# Patient Record
Sex: MALE | Race: WHITE
[De-identification: names, ages, dates, MRNs, and addresses within clinical notes are randomized per-mention and may not be internally consistent; named-entity substitution may affect disease eponyms.]

---

## 2017-07-05 ENCOUNTER — HOSPITAL ENCOUNTER (EMERGENCY)
Dept: HOSPITAL 42 - ED | Age: 27
LOS: 1 days | Discharge: HOME | End: 2017-07-06
Payer: COMMERCIAL

## 2017-07-05 VITALS — TEMPERATURE: 98.7 F

## 2017-07-05 VITALS — BODY MASS INDEX: 23.6 KG/M2

## 2017-07-05 DIAGNOSIS — R07.89: Primary | ICD-10-CM

## 2017-07-05 DIAGNOSIS — Z82.49: ICD-10-CM

## 2017-07-05 LAB
ALBUMIN SERPL-MCNC: 3.9 G/DL
ALBUMIN/GLOB SERPL: 1.1 {RATIO}
ALT SERPL-CCNC: 36 U/L
APPEARANCE UR: (no result)
AST SERPL-CCNC: 24 U/L
BASOPHILS # BLD AUTO: 0.03 K/MM3
BASOPHILS NFR BLD: 0.4 %
BILIRUB UR-MCNC: NEGATIVE MG/DL
BUN SERPL-MCNC: 29 MG/DL
CALCIUM SERPL-MCNC: 9 MG/DL
COLOR UR: YELLOW
EOSINOPHIL # BLD: 0.3 10*3/UL
EOSINOPHIL NFR BLD: 4.8 %
ERYTHROCYTE [DISTWIDTH] IN BLOOD BY AUTOMATED COUNT: 13.1 %
GFR NON-AFRICAN AMERICAN: 53
GLUCOSE UR STRIP-MCNC: NEGATIVE MG/DL
GRANULOCYTES # BLD: 4.15 10*3/UL
GRANULOCYTES NFR BLD: 58.4 %
HGB BLD-MCNC: 14.2 GM/DL
LEUKOCYTE ESTERASE UR-ACNC: NEGATIVE LEU/UL
LYMPHOCYTES # BLD: 2.1 10*3/UL
LYMPHOCYTES NFR BLD AUTO: 29.8 %
MAGNESIUM SERPL-MCNC: 1.6 MG/DL
MCH RBC QN AUTO: 26.9 PG
MCHC RBC AUTO-ENTMCNC: 33.2 G/DL
MCV RBC AUTO: 81.2 FL
MONOCYTES # BLD AUTO: 0.5 10*3/UL
MONOCYTES NFR BLD: 6.6 %
PH UR STRIP: 6 [PH]
PLATELET # BLD: 287 10^3/UL
PMV BLD AUTO: 10.5 FL
PROT UR STRIP-MCNC: 100 MG/DL
RBC # BLD AUTO: 5.27 10^6/UL
RBC # UR STRIP: (no result) /UL
RBC #/AREA URNS HPF: (no result) /HPF
SP GR UR STRIP: 1.02
TROPONIN I SERPL-MCNC: < 0.01 NG/ML
URINE NITRATE: NEGATIVE
UROBILINOGEN UR STRIP-ACNC: 0.2 E.U./DL
WBC # BLD AUTO: 7.1 10^3/UL
WBC #/AREA URNS HPF: (no result) /HPF

## 2017-07-05 PROCEDURE — 85025 COMPLETE CBC W/AUTO DIFF WBC: CPT

## 2017-07-05 PROCEDURE — 93005 ELECTROCARDIOGRAM TRACING: CPT

## 2017-07-05 PROCEDURE — 71010: CPT

## 2017-07-05 PROCEDURE — 82550 ASSAY OF CK (CPK): CPT

## 2017-07-05 PROCEDURE — 80053 COMPREHEN METABOLIC PANEL: CPT

## 2017-07-05 PROCEDURE — 83735 ASSAY OF MAGNESIUM: CPT

## 2017-07-05 PROCEDURE — 99284 EMERGENCY DEPT VISIT MOD MDM: CPT

## 2017-07-05 PROCEDURE — 83615 LACTATE (LD) (LDH) ENZYME: CPT

## 2017-07-05 PROCEDURE — 81001 URINALYSIS AUTO W/SCOPE: CPT

## 2017-07-05 PROCEDURE — 84484 ASSAY OF TROPONIN QUANT: CPT

## 2017-07-05 NOTE — ED PDOC
Arrival/HPI





- General


Chief Complaint: Chest Pain


Time Seen by Provider: 07/05/17 21:27


Historian: Patient





- History of Present Illness


Narrative History of Present Illness (Text): 


07/05/17 21:34


Lisandro Lambert is a 26 year old male, with no significant past medical 

history, who presents to the emergency department complaining of chest pain. 

Patient states he began experiencing mid-sternal chest pain at 19:00 today 

while at work, worsened when lifting boxes. Patient states he currently feels 

better. Patient reports a family history of cardiac disease. Patient denies any 

fever, shortness of breath, nausea, vomiting, diarrhea, neck pain, or any other 

complaints. 


Time/Duration: 4-6 hours (19:00)


Symptom Onset: Gradual


Symptom Course: Improving


Activities at Onset: Light


Modifying Factors (Text): 


Lifting boxes





Context: Work


Associated Symptoms (Text): 


None








Past Medical History





- Provider Review


Nursing Documentation Reviewed: Yes





- Cardiac


Hx Hyperlipemia: Yes





- Renal


Other/Comment: pt states kidney problems





- Psychiatric


Hx Substance Use: No





- Past Surgical History


Past Surgical History: No Previous





- Anesthesia


Hx Anesthesia: No





- Suicidal Assessment


Feels Threatened In Home Enviroment: No





Family/Social History





- Physician Review


Nursing Documentation Reviewed: Yes


Family/Social History: CAD/MI


Smoking Status: Never Smoked


Hx Alcohol Use: No


Hx Substance Use: No


Hx Substance Use Treatment: No





Allergies/Home Meds


Allergies/Adverse Reactions: 


Allergies





No Known Allergies Allergy (Verified 06/26/15 20:31)


 








Home Medications: 


 Home Meds











 Medication  Instructions  Recorded  Confirmed


 


No Known Home Med  07/05/17 07/05/17














Review of Systems





- Physician Review


All systems were reviewed & negative as marked: Yes





- Review of Systems


Constitutional: Normal.  absent: Fevers


Eyes: Normal


ENT: Normal


Respiratory: Normal.  absent: SOB, Cough


Cardiovascular: Chest Pain


Gastrointestinal: Normal.  absent: Abdominal Pain, Diarrhea, Nausea, Vomiting


Genitourinary Male: Normal


Musculoskeletal: Normal.  absent: Back Pain, Neck Pain


Skin: Normal


Neurological: Normal


Endocrine: Normal


Hemo/Lymphatic: Normal


Psychiatric: Normal





Physical Exam


Vital Signs Reviewed: Yes


Vital Signs











  Temp Pulse Resp BP Pulse Ox


 


 07/06/17 01:18   60  18  135/84  98


 


 07/05/17 23:18   75  18  140/86  97


 


 07/05/17 21:34  98.7 F  77  16  145/98 H  99











Temperature: Afebrile


Blood Pressure: Normal


Pulse: Regular


Respiratory Rate: Normal


Appearance: Positive for: Well-Appearing


Pain Distress: None


Mental Status: Positive for: Alert and Oriented X 3





- Systems Exam


Head: Present: Atraumatic, Normocephalic


Pupils: Present: PERRL


Extroacular Muscles: Present: EOMI


Conjunctiva: Present: Normal


Ears: Present: Normal


Mouth: Present: Moist Mucous Membranes


Pharnyx: Present: Normal


Neck: Present: Normal Range of Motion


Respiratory/Chest: Present: Clear to Auscultation, Good Air Exchange.  No: 

Respiratory Distress, Accessory Muscle Use


Cardiovascular: Present: Regular Rate and Rhythm, Normal S1, S2.  No: Murmurs


Abdomen: Present: Normal Bowel Sounds.  No: Tenderness, Distention, Peritoneal 

Signs


Back: Present: Normal Inspection


Upper Extremity: Present: Normal Inspection.  No: Cyanosis, Edema


Lower Extremity: Present: Normal Inspection.  No: Edema


Neurological: Present: GCS=15, CN II-XII Intact, Speech Normal


Skin: Present: Warm, Dry, Normal Color.  No: Rashes


Psychiatric: Present: Alert, Oriented x 3, Normal Insight, Normal Concentration





Medical Decision Making


ED Course and Treatment: 


07/05/17 21:34


Impression:


26 year old male complaining of chest pain. 





Differential Diagnosis included but are not limited to: musculoskeletal pain





Plan:


-- EKG


-- Urinalysis 


-- Chest X-ray 


-- Labs


-- Reassess and disposition








07/05/17 21:34


EKG:


Ordered, reviewed, and independently interpreted the EKG.


Rate : 71 BPM


Rhythm : NSR


Interpretation : No ST-segment elevations or depressions, no T-wave inversions, 

normal intervals.


Comparison : No previous EKG for comparison. 





07/05/17 23:14


CXR Impression: As read by me, no pneumothorax, no pneumonia, no cardiomegaly, 

no infiltrates





07/06/17 01:50


On re-evaluation, the patient feels better and is in no acute distress. I have 

discussed the results and plan with the patient, who expresses understanding. 

Patient in agreement with plan to discharged home. Patient is stable for 

discharge. Patient was instructed to follow up with physician/clinic in 1-2 

days or return if symptoms worsen or new concerning symptoms arise.





Re-evaluation Time: 01:51


Reassessment Condition: Re-examined, Improved





- Lab Interpretations


Lab Results: 








 07/05/17 21:20 





 07/05/17 21:20 





 Lab Results





07/05/17 21:20: Sodium 135, Potassium 3.8, Chloride 105, Carbon Dioxide 23, 

Anion Gap 11, BUN 29 H, Creatinine 1.6 H, Est GFR ( Amer) > 60, Est GFR (

Non-Af Amer) 53, Random Glucose 124 H, Calcium 9.0, Magnesium 1.6 L, Total 

Bilirubin 0.6, AST 24, ALT 36, Alkaline Phosphatase 114, Lactate Dehydrogenase 

408, Total Creatine Kinase 167, Troponin I < 0.01, Total Protein 7.7, Albumin 

3.9, Globulin 3.7, Albumin/Globulin Ratio 1.1


07/05/17 21:20: Urine Color Yellow, Urine Appearance Sl cloudy, Urine pH 6.0, 

Ur Specific Gravity 1.025, Urine Protein 100 H, Urine Glucose (UA) Negative, 

Urine Ketones Negative, Urine Blood Trace-intact H, Urine Nitrate Negative, 

Urine Bilirubin Negative, Urine Urobilinogen 0.2, Ur Leukocyte Esterase Negative

, Urine RBC 0 - 2, Urine WBC 0 - 2


07/05/17 21:20: WBC 7.1, RBC 5.27, Hgb 14.2, Hct 42.8, MCV 81.2, MCH 26.9, MCHC 

33.2, RDW 13.1, Plt Count 287, MPV 10.5, Gran % 58.4, Lymph % (Auto) 29.8, Mono 

% (Auto) 6.6 H, Eos % (Auto) 4.8, Baso % (Auto) 0.4, Gran # 4.15, Lymph # 2.1, 

Mono # 0.5, Eos # 0.3, Baso # 0.03








I have reviewed the lab results: Yes





- RAD Interpretation


Radiology Orders: 








07/05/17 21:40


CHEST PORTABLE [RAD] Stat 











: ED Physician





- EKG Interpretation


Interpreted by ED Physician: Yes


Type: 12 lead EKG





- Medication Orders


Current Medication Orders: 











Discontinued Medications





Sodium Chloride (Sodium Chloride 0.9%)  1,000 mls @ 1,000 mls/hr IV .Q1H LYNDA


   Last Admin: 07/05/17 23:59  Dose: 1,000 mls/hr











- Scribe Statement


07/05/2017 21:34


Dorota Leigh training with Darya Emmanuel





All medical record entries made by the Scribe were at my direction and 

personally dictated by me. I have reviewed the chart and agree that the record 

accurately reflects my personal performance of the history, physical exam, 

medical decision making, and the department course for this patient. I have 

also personally directed, reviewed, and agree with the discharge instructions 

and disposition.








Disposition/Present on Arrival





- Present on Arrival


Any Indicators Present on Arrival: No


History of DVT/PE: No


History of Uncontrolled Diabetes: No


Urinary Catheter: No


History of Decub. Ulcer: No


History Surgical Site Infection Following: None





- Disposition


Have Diagnosis and Disposition been Completed?: Yes


Diagnosis: 


 Muscular chest pain





Disposition: HOME/ ROUTINE


Disposition Time: 01:52


Condition: GOOD


Discharge Instructions (ExitCare):  Chest Pain (ED)


Referrals: 


Morelia Pearson MD [Primary Care Provider] - Follow up with primary


Forms:  WORK NOTE

## 2017-07-06 VITALS
DIASTOLIC BLOOD PRESSURE: 84 MMHG | OXYGEN SATURATION: 98 % | RESPIRATION RATE: 18 BRPM | SYSTOLIC BLOOD PRESSURE: 135 MMHG | HEART RATE: 60 BPM

## 2017-07-06 NOTE — RAD
HISTORY:

cp  



COMPARISON:

No prior. 



FINDINGS:



LUNGS:

No active pulmonary disease.



PLEURA:

No significant pleural effusion identified, no pneumothorax apparent.



CARDIOVASCULAR:

Normal.



OSSEOUS STRUCTURES:

No significant abnormalities.



VISUALIZED UPPER ABDOMEN:

Normal.



OTHER FINDINGS:

None.



IMPRESSION:

No active disease.

## 2017-07-07 NOTE — CARD
--------------- APPROVED REPORT --------------





EKG Measurement

Heart [id number]

[id number]

[id number]

[id number]

[id number]



<Conclusion>

Normal sinus rhythm with sinus arrhythmia

Normal ECG

## 2017-08-18 ENCOUNTER — HOSPITAL ENCOUNTER (EMERGENCY)
Dept: HOSPITAL 42 - ED | Age: 27
Discharge: HOME | End: 2017-08-18
Payer: COMMERCIAL

## 2017-08-18 VITALS — BODY MASS INDEX: 23.6 KG/M2

## 2017-08-18 VITALS
TEMPERATURE: 97.6 F | SYSTOLIC BLOOD PRESSURE: 146 MMHG | DIASTOLIC BLOOD PRESSURE: 87 MMHG | RESPIRATION RATE: 18 BRPM | HEART RATE: 80 BPM | OXYGEN SATURATION: 100 %

## 2017-08-18 DIAGNOSIS — R07.89: Primary | ICD-10-CM

## 2017-08-18 LAB
ALBUMIN SERPL-MCNC: 3.9 G/DL (ref 3–4.8)
ALBUMIN/GLOB SERPL: 1.3 {RATIO} (ref 1.1–1.8)
ALT SERPL-CCNC: 40 U/L (ref 7–56)
APTT BLD: 32.2 SECONDS (ref 23.7–30.8)
AST SERPL-CCNC: 29 U/L (ref 15–59)
BUN SERPL-MCNC: 22 MG/DL (ref 7–21)
CALCIUM SERPL-MCNC: 8.8 MG/DL (ref 8.4–10.5)
ERYTHROCYTE [DISTWIDTH] IN BLOOD BY AUTOMATED COUNT: 13.3 % (ref 11.5–14.5)
GFR NON-AFRICAN AMERICAN: 53
HGB BLD-MCNC: 13.2 G/DL (ref 14–18)
INR PPP: 1.03 (ref 0.93–1.08)
MCH RBC QN AUTO: 26.5 PG (ref 25–35)
MCHC RBC AUTO-ENTMCNC: 32.9 G/DL (ref 31–37)
MCV RBC AUTO: 80.5 FL (ref 80–105)
PLATELET # BLD: 276 10^3/UL (ref 120–450)
PMV BLD AUTO: 10.1 FL (ref 7–11)
PROTHROMBIN TIME: 11.1 SECONDS (ref 9.9–11.8)
RBC # BLD AUTO: 4.98 10^6/UL (ref 3.5–6.1)
TROPONIN I SERPL-MCNC: < 0.01 NG/ML
WBC # BLD AUTO: 7.8 10^3/UL (ref 4.5–11)

## 2017-08-18 NOTE — ED PDOC
Arrival/HPI





- General


Chief Complaint: Chest Pain


Time Seen by Provider: 08/18/17 00:46


Historian: Patient





- History of Present Illness


Narrative History of Present Illness (Text): 


08/18/17 00:51


Lisandro Lambert is a 26 year old male, with no significant past medical 

history, who presents to the emergency department complaining of chest pain 

tonight. 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  Patient denies any recent trauma, fever

, shortness of breath, nausea, vomiting, diarrhea, neck pain, or any other 

complaints. 


Time/Duration: Other (tonight)


Symptom Onset: Gradual


Symptom Course: Unchanged


Context: Work





Past Medical History





- Provider Review


Nursing Documentation Reviewed: Yes





- Cardiac


Hx Hyperlipemia: Yes





- Renal


Other/Comment: pt states kidney problems





- Psychiatric


Hx Substance Use: No





- Past Surgical History


Past Surgical History: No Previous





- Anesthesia


Hx Anesthesia: No





- Suicidal Assessment


Feels Threatened In Home Enviroment: No





Family/Social History





- Physician Review


Nursing Documentation Reviewed: Yes


Family/Social History: Unknown Family HX


Smoking Status: Never Smoked


Hx Alcohol Use: No


Hx Substance Use: No


Hx Substance Use Treatment: No





Allergies/Home Meds


Allergies/Adverse Reactions: 


Allergies





No Known Allergies Allergy (Verified 08/18/17 00:34)


 








Home Medications: 


 Home Meds











 Medication  Instructions  Recorded  Confirmed


 


No Known Home Med  07/05/17 08/18/17














Review of Systems





- Physician Review


All systems were reviewed & negative as marked: Yes





- Review of Systems


Constitutional: Normal.  absent: Fevers


Eyes: Normal


ENT: Normal


Respiratory: Normal.  absent: SOB, Cough


Cardiovascular: Chest Pain.  absent: Palpitations


Gastrointestinal: Normal.  absent: Abdominal Pain, Diarrhea, Nausea, Vomiting


Genitourinary Male: Normal.  absent: Dysuria, Frequency, Hematuria, Urinary 

Output Changes


Musculoskeletal: Normal.  absent: Back Pain, Neck Pain


Skin: Normal.  absent: Rash


Neurological: Normal.  absent: Headache, Dizziness


Endocrine: Normal


Hemo/Lymphatic: Normal


Psychiatric: Normal





Physical Exam


Vital Signs Reviewed: Yes


Vital Signs











  Temp Pulse Resp BP Pulse Ox


 


 08/18/17 04:04  97.6 F  80  18  146/87  100


 


 08/18/17 00:42  98.4 F  69  17  148/86  99


 


 08/18/17 00:25  98.2 F  78  18  128/86  99











Temperature: Afebrile


Blood Pressure: Normal


Pulse: Regular


Respiratory Rate: Normal


Appearance: Positive for: Well-Appearing, Non-Toxic, Comfortable


Pain Distress: None


Mental Status: Positive for: Alert and Oriented X 3





- Systems Exam


Head: Present: Atraumatic, Normocephalic


Pupils: Present: PERRL


Extroacular Muscles: Present: EOMI


Conjunctiva: Present: Normal


Mouth: Present: Moist Mucous Membranes


Neck: Present: Normal Range of Motion


Respiratory/Chest: Present: Clear to Auscultation, Good Air Exchange, Tender to 

Palpation (Chest wall tenderness to palpation).  No: Respiratory Distress, 

Accessory Muscle Use


Cardiovascular: Present: Regular Rate and Rhythm, Normal S1, S2.  No: Murmurs


Abdomen: Present: Normal Bowel Sounds.  No: Tenderness, Distention, Peritoneal 

Signs


Back: Present: Normal Inspection


Upper Extremity: Present: Normal Inspection.  No: Cyanosis, Edema


Lower Extremity: Present: Normal Inspection.  No: Edema


Neurological: Present: GCS=15, CN II-XII Intact, Speech Normal


Skin: Present: Warm, Dry, Normal Color.  No: Rashes


Psychiatric: Present: Alert, Oriented x 3, Normal Insight, Normal Concentration





Medical Decision Making


ED Course and Treatment: 


08/18/17 00:51


Impression:


26 year old male c/o mid-sternal chest pain tonight.





Plan:


-- EKG


-- CXR


-- Labs, cardiac enzymes


-- Reassess and disposition





Progress Notes:


Reviewed EKG, NSR at 76 bpm. No ST-segment elevations or depressions, no T-wave 

inversions, normal intervals.





08/18/17 03:28


Reviewed radiology, CXR shows no acute processes.





08/18/17 04:41


On re-evaluation, pt feels better, denies any chest pain currently. However, pt 

adds he may have been exposed to possible PID by his girlfriend. Pt states he 

would like to be tested. Pt is asymptomatic, denies any dysuria or penile 

discharge.Lab testing performed.Patient is stable for discharge. Patient was 

instructed to follow up with physician/clinic in 1-2 days.





- Lab Interpretations


Lab Results: 








 08/18/17 00:50 





 08/18/17 00:50 





 Lab Results





08/18/17 00:50: WBC 7.8, RBC 4.98, Hgb 13.2 L, Hct 40.1 L, MCV 80.5, MCH 26.5, 

MCHC 32.9, RDW 13.3, Plt Count 276, MPV 10.1


08/18/17 00:50: Sodium 135, Potassium 4.0, Chloride 103, Carbon Dioxide 22, 

Anion Gap 14, BUN 22 H, Creatinine 1.6 H, Est GFR ( Amer) > 60, Est GFR (

Non-Af Amer) 53, Random Glucose 89, Calcium 8.8, Total Bilirubin 0.5, AST 29, 

ALT 40, Alkaline Phosphatase 130, Lactate Dehydrogenase 396, Total Creatine 

Kinase 162, Troponin I < 0.01, Total Protein 6.9, Albumin 3.9, Globulin 3.0, 

Albumin/Globulin Ratio 1.3


08/18/17 00:50: PT 11.1, INR 1.03, APTT 32.2 H








I have reviewed the lab results: Yes





- RAD Interpretation


Radiology Orders: 








08/18/17 00:52


CHEST PORTABLE [RAD] Stat 











: ED Physician





- EKG Interpretation


Interpreted by ED Physician: Yes


Type: 12 lead EKG





- Medication Orders


Current Medication Orders: 











Discontinued Medications





Ibuprofen (Motrin Tab)  600 mg PO STAT STA


   Stop: 08/18/17 01:26


   Last Admin: 08/18/17 01:49  Dose: 600 mg











- Scribe Statement


The provider has reviewed the documentation as recorded by the Harsh Emmanuel


Provider Scribe Attestation:


All medical record entries made by the Scribmaryann were at my direction and 

personally dictated by me. I have reviewed the chart and agree that the record 

accurately reflects my personal performance of the history, physical exam, 

medical decision making, and the department course for this patient. I have 

also personally directed, reviewed, and agree with the discharge instructions 

and disposition.








Disposition/Present on Arrival





- Present on Arrival


Any Indicators Present on Arrival: No


History of DVT/PE: No


History of Uncontrolled Diabetes: No


Urinary Catheter: No


History of Decub. Ulcer: No


History Surgical Site Infection Following: None





- Disposition


Have Diagnosis and Disposition been Completed?: Yes


Diagnosis: 


 Musculoskeletal chest pain





Disposition: HOME/ ROUTINE


Disposition Time: 04:20


Condition: GOOD


Discharge Instructions (ExitCare):  Chest Pain (ED)


Additional Instructions: 


Rest/no strenuous physical activity or heavy lifting next few days/advil as 

directed/follow up with your doctor this week


Referrals: 


Michel,Morelia, MD [Primary Care Provider] - Follow up with primary


Forms:  [facility] (English), WORK NOTE

## 2017-08-18 NOTE — RAD
HISTORY:

pain  



COMPARISON:

Comparison is made to 07/05/2017 



FINDINGS:



LUNGS:

No active pulmonary disease.



PLEURA:

No significant pleural effusion identified, no pneumothorax apparent.



CARDIOVASCULAR:

Normal.



OSSEOUS STRUCTURES:

No significant abnormalities.



VISUALIZED UPPER ABDOMEN:

Normal.



OTHER FINDINGS:

None.



IMPRESSION:

No active disease.

## 2017-08-18 NOTE — CARD
--------------- APPROVED REPORT --------------





EKG Measurement

Heart [id number]

MO [id number]

[id number]

[id number]

[id number]



<Conclusion>

Normal sinus rhythm

Normal ECG

## 2017-08-29 ENCOUNTER — HOSPITAL ENCOUNTER (EMERGENCY)
Dept: HOSPITAL 42 - ED | Age: 27
Discharge: HOME | End: 2017-08-29
Payer: COMMERCIAL

## 2017-08-29 VITALS — BODY MASS INDEX: 23.6 KG/M2

## 2017-08-29 VITALS
HEART RATE: 64 BPM | TEMPERATURE: 99.1 F | SYSTOLIC BLOOD PRESSURE: 125 MMHG | OXYGEN SATURATION: 100 % | RESPIRATION RATE: 18 BRPM | DIASTOLIC BLOOD PRESSURE: 80 MMHG

## 2017-08-29 DIAGNOSIS — M54.5: Primary | ICD-10-CM

## 2017-08-29 NOTE — ED PDOC
Arrival/HPI





- General


Historian: Patient





- History of Present Illness


Time/Duration: 24 hours


Symptom Onset: Sudden


Symptom Course: Worsening


Quality: Tightness


Severity Level: Severe


Activities at Onset: Other (lifting heavy object)


Context: Work





<Elías Simmons - Last Filed: 08/29/17 04:50>





<Josiah Gil - Last Filed: 08/29/17 05:20>





- General


Chief Complaint: Back Pain


Time Seen by Provider: 08/29/17 04:36





- History of Present Illness


Narrative History of Present Illness (Text): 





08/29/17 04:55


25yo M with no significant PMHx here for evaluation of back pain. Patient was 

at work, he works during the night, he stocks boxes at work. He was lifting a 

box about 30 lbs from the floor when he felt sharp low back pain. Pain 

sometimes radiates down to right thigh. No problems with ambulation. The 

episode started at 0530 yesterday. He states that he has tried heating packs 

and advil with mild improvement. Denies any urinary changes. No N/V/D. No Abd 

pain. No Fever, no chills. 





 (Elías Simmons)





Past Medical History





- Provider Review


Nursing Documentation Reviewed: Yes





- Past History


Past History: No Previous





- Cardiac


Hx Hypertension: Yes





- Pulmonary


Hx Respiratory Disorders: No





- Neurological


Hx Neurological Disorder: No





- HEENT


Hx HEENT Disorder: No





- Renal


Hx Renal Disorder: No


Other/Comment: pt states kidney problems





- Endocrine/Metabolic


Hx Endocrine Disorders: No





- Hematological/Oncological


Hx Blood Disorders: No





- Integumentary


Hx Dermatological Disorder: No





- Musculoskeletal/Rheumatological


Hx Musculoskeletal Disorders: No





- Gastrointestinal


Hx Gastrointestinal Disorders: No





- Genitourinary/Gynecological


Hx Genitourinary Disorders: No





- Psychiatric


Hx Psychophysiologic Disorder: No


Hx Substance Use: No





- Past Surgical History


Past Surgical History: No Previous





- Anesthesia


Hx Anesthesia: No





- Suicidal Assessment


Feels Threatened In Home Enviroment: No





<Elías Simmons - Last Filed: 08/29/17 04:50>





Family/Social History





- Physician Review


Nursing Documentation Reviewed: Yes


Family/Social History: Unknown Family HX


Smoking Status: Never Smoked


Hx Alcohol Use: Yes


Frequency of alcohol use: Socially


Hx Substance Use: No


Hx Substance Use Treatment: No





<Elías Simmons - Last Filed: 08/29/17 04:50>





Allergies/Home Meds





<Elías Simmons - Last Filed: 08/29/17 04:50>





<Josiah Gil - Last Filed: 08/29/17 05:20>


Allergies/Adverse Reactions: 


Allergies





No Known Allergies Allergy (Verified 08/29/17 04:43)


 











Review of Systems





- Physician Review


All systems were reviewed & negative as marked: Yes





- Review of Systems


Constitutional: Normal


Eyes: Normal


ENT: Normal


Respiratory: Normal


Cardiovascular: absent: Chest Pain


Gastrointestinal: Normal.  absent: Abdominal Pain, Nausea, Vomiting


Genitourinary Male: absent: Dysuria, Frequency


Musculoskeletal: Back Pain


Skin: Normal


Neurological: Normal.  absent: Dizziness, Focal Weakness


Endocrine: Normal


Hemo/Lymphatic: Normal


Psychiatric: Normal





<Elías Simmons - Last Filed: 08/29/17 04:50>





Physical Exam


Vital Signs Reviewed: Yes


Temperature: Afebrile


Blood Pressure: Normal


Pulse: Regular


Appearance: Positive for: Well-Appearing, Non-Toxic, Comfortable


Pain Distress: Mild


Mental Status: Positive for: Alert and Oriented X 3





- Systems Exam


Head: Present: Atraumatic, Normocephalic


Extroacular Muscles: Present: EOMI


Conjunctiva: Present: Normal


Mouth: Present: Moist Mucous Membranes


Neck: Present: Normal Range of Motion.  No: MIDLINE TENDERNESS, Paraspinal 

Tenderness


Respiratory/Chest: Present: Clear to Auscultation, Good Air Exchange.  No: 

Respiratory Distress, Accessory Muscle Use, Wheezes, Decreased Breath Sounds


Cardiovascular: Present: Regular Rate and Rhythm, Normal S1, S2.  No: Murmurs


Abdomen: No: Tenderness, Distention, Peritoneal Signs, Rebound, Guarding


Back: Present: Normal Inspection, Paraspinal Tenderness (low back).  No: 

Midline Tenderness


Upper Extremity: Present: Normal Inspection, Normal ROM, NORMAL PULSES.  No: 

Cyanosis, Edema


Lower Extremity: Present: Normal Inspection, NORMAL PULSES, Normal ROM.  No: 

Edema, CALF TENDERNESS


Neurological: Present: GCS=15


Skin: Present: Warm, Dry, Normal Color.  No: Rashes


Psychiatric: Present: Alert, Oriented x 3





<Elías Simmons Last Filed: 08/29/17 04:50>





Medical Decision Making





<Elías Simmons - Last Filed: 08/29/17 04:50>





<Josiah Gil - Last Filed: 08/29/17 05:20>


ED Course and Treatment: 





08/29/17 05:01


25yo M with low back pain


- Prescribed Flexiril and Ibuprofen 600mg. 


- Discussed with patient about the importance of follow up with his Primary 

Care physician. No heavy lifting for at least 7 days. Rest, ice. Patient 

understands and agrees with plan. All questions and concerns addressed.  (Elías Simmons)





08/29/17 05:15


In agreement with resident note, which includes further HPI details. Patient 

was seen and evaluated with resident, came up with plan and treatment together. 

26 year old male present complaining of back pain that began tonight at work.  (

Josiah Gil)





- Medication Orders


Current Medication Orders: 














Discontinued Medications





Cyclobenzaprine HCl (Flexeril)  10 mg PO STAT STA


   Stop: 08/29/17 04:52


   Last Admin: 08/29/17 04:56  Dose: 10 mg





Ibuprofen (Motrin Tab)  600 mg PO STAT STA


   Stop: 08/29/17 04:46


   Last Admin: 08/29/17 04:55  Dose: 600 mg











- PA / NP / Resident Statement


MD/DO has reviewed & agrees with the documentation as recorded.


MD/DO has examined the patient and agrees with the treatment plan.





<Elías Simmons - Last Filed: 08/29/17 04:50>





- Scribe Statement


The provider has reviewed the documentation as recorded by the Scribe





<Josiah Gil - Last Filed: 08/29/17 05:20>





- Scribe Statement





China Adams


All medical record entries made by the Scribe were at my direction and 

personally dictated by me. I have reviewed the chart and agree that the record 

accurately reflects my personal performance of the history, physical exam, 

medical decision making, and the department course for this patient. I have 

also personally directed, reviewed, and agree with the discharge instructions 

and disposition. (Josiah Gil)





Disposition/Present on Arrival





- Present on Arrival


Any Indicators Present on Arrival: No


History of DVT/PE: No


History of Uncontrolled Diabetes: No


Urinary Catheter: No


History of Decub. Ulcer: No


History Surgical Site Infection Following: None





- Disposition


Have Diagnosis and Disposition been Completed?: Yes


Disposition Time: 05:04


Patient Plan: Discharge





<Elías Simmons - Last Filed: 08/29/17 04:50>





<Josiah Gil - Last Filed: 08/29/17 05:20>





- Disposition


Diagnosis: 


 Low back pain





Disposition: HOME/ ROUTINE


Patient Problems: 


 Current Active Problems











Problem Status Onset


 


Low back pain Acute  











Condition: IMPROVED


Discharge Instructions (ExitCare):  Back Pain (ED), Back Exercises (ED)


Additional Instructions: 


1. Follow up with your Primary Care physician within 3 days


2. Use Ibuprofen as directed as prescribed


3. Use muscle relaxant as needed as prescribed


4. Return to the ER with any concerning symptoms


Prescriptions: 


Cyclobenzaprine [Cyclobenzaprine HCl] 10 mg PO Q8 PRN #15 tab


 PRN Reason: Muscle Spasm


Ibuprofen [Motrin Tab] 600 mg PO Q8 PRN #21 tab


 PRN Reason: Pain, Moderate (4-7)


Forms:  [facility] (English), WORK NOTE

## 2017-09-12 ENCOUNTER — HOSPITAL ENCOUNTER (EMERGENCY)
Dept: HOSPITAL 42 - ED | Age: 27
LOS: 1 days | Discharge: HOME | End: 2017-09-13
Payer: COMMERCIAL

## 2017-09-12 VITALS
RESPIRATION RATE: 18 BRPM | HEART RATE: 74 BPM | TEMPERATURE: 99 F | SYSTOLIC BLOOD PRESSURE: 133 MMHG | DIASTOLIC BLOOD PRESSURE: 88 MMHG | OXYGEN SATURATION: 98 %

## 2017-09-12 VITALS — BODY MASS INDEX: 23.6 KG/M2

## 2017-09-12 DIAGNOSIS — M54.5: Primary | ICD-10-CM

## 2017-09-12 NOTE — ED PDOC
Arrival/HPI





- General


Chief Complaint: Back Pain


Time Seen by Provider: 09/12/17 23:05


Historian: Patient





- History of Present Illness


Narrative History of Present Illness (Text): 





09/12/17 23:41


27yr old male presents today with back pain that started 1 hour ago s/p lifting 

heavy object. pt c/o pain to the right low back. pt states pain is non 

radiating and worse with sitting. no bladder or bowel incontinence. no numbness

, weakness, or tingling in the extremity. no fever/chills. no abdominal pain. 

no dizziness or weakness. pt states he has similar pain about 2 week ago after 

lifting heavy object. pt states he had xrays on last ER visit. pt states he 

took motrin prior to arrival. Pt states sometimes the pain is in the left lower 

back and a sometimes at the right lower back. 


Time/Duration: 1 hour


Symptom Onset: Sudden


Symptom Course: Improving


Quality: Aching


Severity Level: 7





Past Medical History





- Provider Review


Nursing Documentation Reviewed: Yes





- Travel History


Have you recently traveled outside [place] w/in the past 3 mons?: No





- Past History


Past History: No Previous





- Tetanus Immunization


Tetanus Immunization: Unknown





- Cardiac


Hx Hypertension: Yes





- Pulmonary


Hx Respiratory Disorders: No





- Neurological


Hx Neurological Disorder: No





- HEENT


Hx HEENT Disorder: No





- Renal


Hx Renal Disorder: No


Other/Comment: pt states kidney problems





- Endocrine/Metabolic


Hx Endocrine Disorders: No





- Hematological/Oncological


Hx Blood Disorders: No





- Integumentary


Hx Dermatological Disorder: No





- Musculoskeletal/Rheumatological


Hx Musculoskeletal Disorders: No





- Gastrointestinal


Hx Gastrointestinal Disorders: No





- Genitourinary/Gynecological


Hx Genitourinary Disorders: No





- Psychiatric


Hx Psychophysiologic Disorder: No


Hx Substance Use: No





- Past Surgical History


Past Surgical History: No Previous





- Anesthesia


Hx Anesthesia: No





- Suicidal Assessment


Feels Threatened In Home Enviroment: No





Family/Social History





- Physician Review


Nursing Documentation Reviewed: Yes


Family/Social History: Unknown Family HX


Smoking Status: Never Smoked


Hx Alcohol Use: Yes


Hx Substance Use: No


Hx Substance Use Treatment: No





Allergies/Home Meds


Allergies/Adverse Reactions: 


Allergies





No Known Allergies Allergy (Verified 08/29/17 04:43)


 











Review of Systems





- Review of Systems


Constitutional: absent: Fatigue, Fevers


Respiratory: absent: SOB, Cough


Cardiovascular: absent: Chest Pain, Palpitations


Gastrointestinal: absent: Abdominal Pain, Nausea, Vomiting


Genitourinary Male: absent: Dysuria, Frequency, Hematuria, Urinary Output 

Changes


Musculoskeletal: Back Pain.  absent: Arthralgias, Neck Pain


Skin: absent: Rash, Pruritis


Neurological: absent: Headache, Dizziness


Psychiatric: absent: Anxiety, Depression, Suicidal Ideation





Physical Exam


Vital Signs Reviewed: Yes


Vital Signs











  Temp Pulse Resp BP Pulse Ox


 


 09/12/17 23:06  99 F  74  18  133/88  98











Temperature: Afebrile


Blood Pressure: Normal


Pulse: Regular


Respiratory Rate: Normal


Appearance: Positive for: Well-Appearing, Non-Toxic, Comfortable


Pain Distress: None


Mental Status: Positive for: Alert and Oriented X 3





- Systems Exam


Head: Present: Atraumatic


Mouth: Present: Moist Mucous Membranes


Neck: Present: Normal Range of Motion


Respiratory/Chest: Present: Clear to Auscultation, Good Air Exchange.  No: 

Respiratory Distress, Accessory Muscle Use


Cardiovascular: Present: Regular Rate and Rhythm, Normal S1, S2.  No: Murmurs


Abdomen: No: Tenderness, Distention, Peritoneal Signs, Rebound, Guarding


Back: Present: Normal Inspection, Paraspinal Tenderness (+ right lower lumbar 

paraspinal tenderness).  No: CVA Tenderness, Midline Tenderness


Upper Extremity: Present: Normal Inspection


Lower Extremity: Present: Normal Inspection


Neurological: Present: GCS=15, Speech Normal, Motor Func Grossly Intact, Normal 

Sensory Function, Gait Normal


Skin: Present: Warm, Dry


Psychiatric: Present: Alert, Oriented x 3





Medical Decision Making


ED Course and Treatment: 





09/12/17 23:45


Patient nontoxic well-appearing in no distress with stable vital signs. 





pt ambulating with steady gait; no distress





valium and tramadol given po 





Patient reassessment: Feeling better with medications ambulating with a steady 

gait. Muscle strength 5 out of 5 bilaterally.








I advised to followup with the orthopedist within the next 2 days. Return if 

symptoms worsen persist or new symptoms develop





pt does not want a note to stay home for work. pt states he wants to return to 

work. 





Patient verbalizes understanding of discharge instructions and need for 

immediate followup.





all aspects of this case were discussed the attending of record. 








Impression: Back pain 


Motrin every 6 hours as needed for pain


baclofen one tablet every 8 hours as needed for muscle spasms: May cause 

drowsiness


tramadol one tablet every 6 hours as needed for moderate to severe pain: May 

cause drowsiness


Followup with the orthopedist within the next 2 days 


Followup with primary care physician within the next 2 days


Return if symptoms worsen persist or if new symptoms develop








- Medication Orders


Current Medication Orders: 











Discontinued Medications





Diazepam (Valium)  2 mg PO ONCE ONE


   PRN Reason: Protocol


   Stop: 09/12/17 23:31


   Last Admin: 09/13/17 00:18  Dose: 2 mg





Tramadol HCl (Ultram)  50 mg PO STAT STA


   Stop: 09/12/17 23:31


   Last Admin: 09/13/17 00:15  Dose: 50 mg











Disposition/Present on Arrival





- Present on Arrival


Any Indicators Present on Arrival: No


History of DVT/PE: No


History of Uncontrolled Diabetes: No


Urinary Catheter: No


History of Decub. Ulcer: No


History Surgical Site Infection Following: None





- Disposition


Have Diagnosis and Disposition been Completed?: Yes


Diagnosis: 


 Back pain





Disposition: HOME/ ROUTINE


Disposition Time: 23:47


Patient Plan: Discharge


Patient Problems: 


 Current Active Problems











Problem Status Onset


 


Back pain Acute  











Condition: GOOD


Discharge Instructions (ExitCare):  Acute Low Back Pain (ED)


Additional Instructions: 


Motrin every 6 hours as needed for pain


baclofen one tablet every 8 hours as needed for muscle spasms: May cause 

drowsiness


tramadol one tablet every 6 hours as needed for moderate to severe pain: May 

cause drowsiness


Followup with the orthopedist within the next 2 days 


Followup with primary care physician within the next 2 days


Return if symptoms worsen persist or if new symptoms develop


Prescriptions: 


Baclofen [Lioresal] 10 mg PO Q8H PRN #10 tab


 PRN Reason: muscle spasms


Ibuprofen [Motrin] 600 mg PO Q6H PRN #20 tab


 PRN Reason: pain/fever reduction


traMADol [Ultram] 50 mg PO Q6H PRN #6 tab


 PRN Reason: moderate to severe pain


Referrals: 


Haris Deshpande MD [Staff Provider] - Follow up with primary


Bruno Sher DO [Staff Provider] - Follow up with primary


Rebel Malcolm III, MD [Medical Doctor] - Follow up with primary


Forms:  [employer]er (English)

## 2017-11-22 ENCOUNTER — HOSPITAL ENCOUNTER (EMERGENCY)
Dept: HOSPITAL 42 - ED | Age: 27
Discharge: HOME | End: 2017-11-22
Payer: COMMERCIAL

## 2017-11-22 VITALS — SYSTOLIC BLOOD PRESSURE: 132 MMHG | HEART RATE: 84 BPM | OXYGEN SATURATION: 99 % | DIASTOLIC BLOOD PRESSURE: 66 MMHG

## 2017-11-22 VITALS — TEMPERATURE: 97.6 F

## 2017-11-22 VITALS — RESPIRATION RATE: 18 BRPM

## 2017-11-22 VITALS — BODY MASS INDEX: 23.6 KG/M2

## 2017-11-22 DIAGNOSIS — S46.912A: Primary | ICD-10-CM
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
## 2017-11-22 NOTE — RAD
PROCEDURE:  Radiographs of the Left Shoulder



HISTORY:

Shoulder pain







COMPARISON:

None. Signed report



FINDINGS:



BONES:

Normal. No fracture.



JOINTS:

Normal. Glenohumeral and acromioclavicular joints preserved. No 

osteoarthritis.



SOFT TISSUES:

Normal.



OTHER FINDINGS:

None. 



IMPRESSION:

Normal radiographs of the left shoulder.

## 2017-11-22 NOTE — ED PDOC
Arrival/HPI





- General


Chief Complaint: Upper Extremity Problem/Injury


Time Seen by Provider: 11/22/17 05:48





- History of Present Illness


Narrative History of Present Illness (Text): 


27 year old male with history of Chronic lower back pain presents with left 

shoulder pain.  Fiance was at bedside. Patient states he was lifting a big 

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 

shoulder.  He rates the pain an 8/10 and states that it feels like someone 

punched him in the arm. He states the pain radiates to his neck. 1000mg Tylenol 

did not help with the pain. Ice helped to mildly relieve the pain.  Associated 

symptoms include numbness, tingling, and subjective distal left upper ext. 

swelling.





ROS


   POSITIVES: Shoulder pain that radiates to neck, distal left upper ext. 

numbness and tingling. 





PMHx: Lower Back Pain, Elevated BP


PSHx: None


Allergies: NKDA


SocialHx: Denies tobacco or illicit drug use.  Admits to binge drinking on the 

weekends


Fam Hx: Grandpa and Uncle have diabetes. Grandmother has Alzheimer's Dementia.  

Multiple family members with HTN.   


Meds: None. 


11/22/17 06:17





Time/Duration: 1-3 hours


Symptom Onset: Sudden


Symptom Course: Unchanged


Severity Level: 8





Past Medical History





- Provider Review


Nursing Documentation Reviewed: Yes





- Past History


Past History: No Previous





- Infectious Disease


Hx of Infectious Diseases: None





- Tetanus Immunization


Tetanus Immunization: Unknown





- Cardiac


Hx Hypertension: Yes





- Pulmonary


Hx Respiratory Disorders: No





- Neurological


Hx Neurological Disorder: No





- HEENT


Hx HEENT Disorder: No





- Renal


Hx Renal Disorder: No


Other/Comment: pt states kidney problems





- Endocrine/Metabolic


Hx Endocrine Disorders: No





- Hematological/Oncological


Hx Blood Disorders: No





- Integumentary


Hx Dermatological Disorder: No





- Musculoskeletal/Rheumatological


Hx Musculoskeletal Disorders: No





- Gastrointestinal


Hx Gastrointestinal Disorders: No





- Genitourinary/Gynecological


Hx Genitourinary Disorders: No





- Psychiatric


Hx Psychophysiologic Disorder: No


Hx Substance Use: No





- Past Surgical History


Past Surgical History: No Previous





- Anesthesia


Hx Anesthesia: No





- Suicidal Assessment


Feels Threatened In Home Enviroment: No





Family/Social History





- Physician Review


Nursing Documentation Reviewed: Yes


Family/Social History: Diabetes, Hypertension


Smoking Status: Never Smoked


Hx Alcohol Use: Yes


Hx Substance Use: No


Hx Substance Use Treatment: No





Allergies/Home Meds


Allergies/Adverse Reactions: 


Allergies





No Known Allergies Allergy (Verified 08/29/17 04:43)


 











Review of Systems





- Review of Systems


Constitutional: Normal


Eyes: Normal


ENT: Normal


Respiratory: Normal.  absent: SOB, Cough


Cardiovascular: Normal.  absent: Chest Pain, Palpitations


Gastrointestinal: Normal.  absent: Abdominal Pain


Musculoskeletal: Back Pain, Neck Pain, Other (Left Shoulder pain. )


Neurological: Other (Distal Left Upper Ext. Numbness and Tingling )


Psychiatric: Normal





Physical Exam


Vital Signs Reviewed: Yes


Vital Signs











  Temp Pulse Resp BP Pulse Ox


 


 11/22/17 05:49  97.6 F    


 


 11/22/17 05:45    18   99


 


 11/22/17 05:31   84  26 H  132/66  99











Temperature: Afebrile


Blood Pressure: Normal


Pulse: Regular


Respiratory Rate: Normal


Appearance: Positive for: Well-Appearing, Non-Toxic


Pain Distress: Moderate


Mental Status: Positive for: Alert and Oriented X 3





- Systems Exam


Head: Present: Atraumatic, Normocephalic


Extroacular Muscles: Present: EOMI


Conjunctiva: Present: Normal


Mouth: Present: Moist Mucous Membranes


Neck: Present: Normal Range of Motion, Paraspinal Tenderness (Left Sided)


Respiratory/Chest: Present: Clear to Auscultation.  No: Respiratory Distress, 

Wheezes, Rales, Rhonchi


Cardiovascular: Present: Regular Rate and Rhythm, Normal S1, S2, Peripheal 

Pulses Present.  No: Murmurs


Abdomen: Present: Normal Bowel Sounds.  No: Tenderness


Upper Extremity: Present: Tenderness.  No: Erythema


Neurological: Present: GCS=15, Speech Normal


Skin: Present: Warm, Dry, Normal Color


Psychiatric: Present: Alert, Oriented x 3, Normal Affect, Normal Mood





Medical Decision Making


ED Course and Treatment: 


Shoulder Pain


Likely rotator cuff injury vs bicep tendon injury.  





L Shoulder X-ray


400mg Ibuprofen


Reassess and Disposition





X-ray negative for Dislocation or Fractures





Will ask patient to follow up with PMD and Orthopedics.


Will ask patient to Ice and take Ibuprofen for pain.











11/22/17 06:33





11/22/17 06:48





11/22/17 06:52





Reassessment Condition: Re-examined, Improving,but remains with symptoms





- RAD Interpretation


Radiology Orders: 








11/22/17 06:16


SHOULDER LEFT [RAD] Stat 














- Medication Orders


Current Medication Orders: 











Discontinued Medications





Ibuprofen (Motrin Tab)  400 mg PO STAT STA


   Stop: 11/22/17 06:17


   Last Admin: 11/22/17 06:55 Dose:  400 mg











Disposition/Present on Arrival





- Present on Arrival


Any Indicators Present on Arrival: No


History of DVT/PE: No


History of Uncontrolled Diabetes: No


Urinary Catheter: No


History of Decub. Ulcer: No


History Surgical Site Infection Following: None





- Disposition


Have Diagnosis and Disposition been Completed?: Yes


Diagnosis: 


 Left shoulder strain





Disposition: HOME/ ROUTINE


Disposition Time: 06:47


Patient Plan: Discharge


Patient Problems: 


 Current Active Problems











Problem Status Onset


 


Left shoulder strain Acute  











Condition: FAIR


Discharge Instructions (ExitCare):  Rotator Cuff Injury (ED)


Additional Instructions: 


Please follow up with your primary care physician





Please follow up with Orthopedic physician.  Referral is in your discharge 

packet.





Please Ice affected area for 20 minutes every 4 hours.  You may also take Advil 

as instructed to help with the pain.





Return to the ED if symptoms worsen or return.


Referrals: 


Morelia Pearson MD [Primary Care Provider] - Follow up with primary


[facility] [place], [place] [Outside] - Follow up with primary


[facility] [place] [Outside] - Follow up with primary


Forms:  [employer] (English), WORK NOTE

## 2018-04-26 ENCOUNTER — HOSPITAL ENCOUNTER (EMERGENCY)
Dept: HOSPITAL 42 - ED | Age: 28
Discharge: HOME | End: 2018-04-26
Payer: COMMERCIAL

## 2018-04-26 VITALS — TEMPERATURE: 98.1 F | HEART RATE: 72 BPM | DIASTOLIC BLOOD PRESSURE: 86 MMHG | SYSTOLIC BLOOD PRESSURE: 129 MMHG

## 2018-04-26 VITALS — RESPIRATION RATE: 16 BRPM

## 2018-04-26 VITALS — OXYGEN SATURATION: 99 %

## 2018-04-26 VITALS — BODY MASS INDEX: 23.6 KG/M2

## 2018-04-26 DIAGNOSIS — R51: Primary | ICD-10-CM

## 2018-04-26 DIAGNOSIS — I10: ICD-10-CM

## 2018-04-26 PROCEDURE — 96361 HYDRATE IV INFUSION ADD-ON: CPT

## 2018-04-26 PROCEDURE — 96374 THER/PROPH/DIAG INJ IV PUSH: CPT

## 2018-04-26 PROCEDURE — 93005 ELECTROCARDIOGRAM TRACING: CPT

## 2018-04-26 PROCEDURE — 96375 TX/PRO/DX INJ NEW DRUG ADDON: CPT

## 2018-04-26 PROCEDURE — 70450 CT HEAD/BRAIN W/O DYE: CPT

## 2018-04-26 PROCEDURE — 99285 EMERGENCY DEPT VISIT HI MDM: CPT

## 2018-04-26 NOTE — CT
PROCEDURE:  CT HEAD WITHOUT CONTRAST.



HISTORY:

Severe headache



COMPARISON:

None available. 



TECHNIQUE:

Axial computed tomography images were obtained through the head/brain 

without intravenous contrast.  



Radiation dose:



Total exam DLP = 909.73 MGy-cm.



This CT exam was performed using one or more of the following dose 

reduction techniques: Automated exposure control, adjustment of the 

mA and/or kV according to patient size, and/or use of iterative 

reconstruction technique.



FINDINGS:



HEMORRHAGE:

No intracranial hemorrhage. 



BRAIN:

No mass effect or edema.  No atrophy or chronic microvascular 

ischemic changes.



VENTRICLES:

Unremarkable. No hydrocephalus. 



CALVARIUM:

Unremarkable.



PARANASAL SINUSES:

Unremarkable as visualized. No significant inflammatory changes.



MASTOID AIR CELLS:

Unremarkable as visualized. No inflammatory changes.



OTHER FINDINGS:

None.



IMPRESSION:

Normal CT of the Head.

## 2018-04-26 NOTE — ED PDOC
Arrival/HPI





<Amy Andrade - Last Filed: 04/26/18 14:19>





- General


Historian: Patient





- History of Present Illness


Time/Duration: 1-3 hours


Symptom Onset: Sudden


Symptom Course: Unchanged


Quality: Throbbing


Severity Level: 8


Activities at Onset: Other (at work, painting)





<Angel Weaver - Last Filed: 04/26/18 14:48>





- General


Chief Complaint: Dizziness/Lightheaded


Time Seen by Provider: 04/26/18 11:53





- History of Present Illness


Narrative History of Present Illness (Text): 





Patient is a 27 year old male with a past medical history of hypertension 

presenting to the ED with a headache. Patient is sitting up resting comfortably 

in bed, taking in full sentences, appearing in no acute distress. The headache 

is described as the worst headache he has ever had. It started suddenly as he 

was working as a  in the attic when it felt like he got hit in the head. 

He experienced tunnel vision and nausea but did not vomit. The tunnel vision 

resolved soon after onset. The room appeared to be spinning but he did not get 

lightheaded or dizzy. Denies LOC. He attempted to take ibuprofen but the 

headache did not improve. Denies fevers, chills, diarrhea, constipation, chest 

pain, sob, abdominal pain, numbness, tingling, trauma, sore throat, cough, 

photophobia, sick contacts. He was working along side other painters in the 

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 He has been eating and drinking 

normally. 








04/26/18 13:00


 (Angel Weaver)





Past Medical History





- Provider Review


Nursing Documentation Reviewed: Yes





- Past History


Past History: No Previous





- Infectious Disease


Hx of Infectious Diseases: None





- Tetanus Immunization


Tetanus Immunization: Unknown





- Cardiac


Hx Cardiac Disorders: Yes


Hx Hypertension: Yes





- Pulmonary


Hx Respiratory Disorders: No





- Neurological


Hx Neurological Disorder: No





- HEENT


Hx HEENT Disorder: No





- Renal


Hx Renal Disorder: No


Other/Comment: pt states kidney problems





- Endocrine/Metabolic


Hx Endocrine Disorders: No





- Hematological/Oncological


Hx Blood Disorders: No





- Integumentary


Hx Dermatological Disorder: No





- Musculoskeletal/Rheumatological


Hx Musculoskeletal Disorders: No





- Gastrointestinal


Hx Gastrointestinal Disorders: No





- Genitourinary/Gynecological


Hx Genitourinary Disorders: No





- Psychiatric


Hx Psychophysiologic Disorder: No


Hx Substance Use: No





- Past Surgical History


Past Surgical History: No Previous





- Anesthesia


Hx Anesthesia: No





- Suicidal Assessment


Feels Threatened In Home Enviroment: No





<Angel Weaver - Last Filed: 04/26/18 14:48>





Family/Social History





- Physician Review


Nursing Documentation Reviewed: Yes


Family/Social History: Hypertension


Smoking Status: Never Smoked


Hx Alcohol Use: Yes


Hx Substance Use: No


Hx Substance Use Treatment: No





<Angel Weaver - Last Filed: 04/26/18 14:48>





Allergies/Home Meds





<Amy Andrade - Last Filed: 04/26/18 14:19>





<Angel Weaver - Last Filed: 04/26/18 14:48>


Allergies/Adverse Reactions: 


Allergies





No Known Allergies Allergy (Verified 04/26/18 12:01)


 








Home Medications: 


 Home Meds











 Medication  Instructions  Recorded  Confirmed


 


No Known Home Med  04/26/18 04/26/18














Review of Systems





- Review of Systems


Constitutional: Normal.  absent: Fatigue


Eyes: Vision Changes (tunnel vision, resolved)


ENT: Normal.  absent: Tinnitus, Sore Throat


Respiratory: Normal


Cardiovascular: Normal.  absent: Chest Pain, Palpitations


Gastrointestinal: Nausea.  absent: Abdominal Pain, Constipation, Diarrhea, 

Vomiting, Appetite Changes


Genitourinary Male: Normal


Musculoskeletal: Normal


Skin: Normal.  absent: Rash


Neurological: Headache, Dizziness.  absent: Focal Weakness, Gait Changes, 

Speech Changes


Endocrine: Normal.  absent: Diaphoresis


Hemo/Lymphatic: Normal.  absent: Adenopathy


Psychiatric: Normal.  absent: Anxiety, Depression





<Angel Weaver - Last Filed: 04/26/18 14:48>





Physical Exam


Vital Signs Reviewed: Yes


Temperature: Afebrile


Blood Pressure: Hypertensive


Pulse: Regular


Respiratory Rate: Normal


Appearance: Positive for: Well-Appearing, Non-Toxic, Comfortable


Pain Distress: None


Mental Status: Positive for: Alert and Oriented X 3





- Systems Exam


Head: Present: Atraumatic, Normocephalic


Pupils: Present: PERRL


Extroacular Muscles: Present: EOMI


Conjunctiva: Present: Normal


Ears: Present: Normal, NORMAL TM, Normal Canal.  No: Erythema, TM Bulging


Mouth: Present: Moist Mucous Membranes, Normal Lips, Normal Tounge


Pharnyx: Present: Normal.  No: ERYTHEMA, EXUDATE, TONSILS ENLARGED, Muffled/

Hoarse Voice


Nose (External): Present: Atraumatic


Nose (Internal): Present: Normal Inspection, No Active Bleeding


Neck: Present: Normal Range of Motion.  No: Meningeal Signs, MIDLINE TENDERNESS

, Paraspinal Tenderness, JVD, Bruit


Respiratory/Chest: Present: Clear to Auscultation, Good Air Exchange.  No: 

Respiratory Distress, Accessory Muscle Use, Rales, Rhonchi


Cardiovascular: Present: Regular Rate and Rhythm, Normal S1, S2.  No: Murmurs


Abdomen: Present: Normal Bowel Sounds.  No: Tenderness, Distention, Peritoneal 

Signs, Rebound, Guarding


Back: Present: Normal Inspection.  No: Midline Tenderness, Paraspinal Tenderness


Upper Extremity: Present: Normal Inspection, NORMAL PULSES.  No: Cyanosis, Edema

, Swelling


Lower Extremity: Present: Normal Inspection, NORMAL PULSES.  No: Edema, CALF 

TENDERNESS


Neurological: Present: GCS=15, CN II-XII Intact, Speech Normal, Motor Func 

Grossly Intact, Normal Sensory Function, Normal Cerebellar Funct (normal heel 

to shin, finger to nose), Gait Normal


Skin: Present: Warm, Dry, Normal Color.  No: Rashes


Lymphatic: No: Cervical Adenopathy


Psychiatric: Present: Alert, Oriented x 3, Normal Insight, Normal Concentration





<Angel Weaver - Last Filed: 04/26/18 14:48>


Vital Signs











  Temp Pulse Resp BP Pulse Ox


 


 04/26/18 11:56  97.6 F  68  17  133/94 H  99














Medical Decision Making





<Amy Andrade - Last Filed: 04/26/18 14:19>


Re-evaluation Time: 14:44


Reassessment Condition: Re-examined





- RAD Interpretation


: Radiologist





- EKG Interpretation


Interpreted by ED Physician: Yes


Type: 12 lead EKG


Comparison: Similar to previous EKG





<Angel Weaver - Last Filed: 04/26/18 14:48>


ED Course and Treatment: 








04/26/18 14:14


Patient was seen by resident and then evaluated by me.  He reports headache 

that developed while he was working.  Headache sounds consistent with migraine. 

No medical history.  CT head done within 6 hours of onset of headache was 

normal.  EKG shows NSR at 65 bpm with normal intervals and no st changes.  On 

my evaluation, he is well appearing and ambulating around the ED without issue. 

He is txting on his cell phone and watching videos.   He is  neurologically 

intact and has normal visual acuity.   On reevaluation after medication he 

reports that headache is resolved.  He was instructed to follow-up with PMD and 

neurology.  





 (Amy Andrade)





04/26/18 12:43


Head CT w/o - normal.





Given Reglan, Tylenol, Benadryl, NS 1L bolus and meclizine.





Upon re-eval, patient is smiling and talking with his cousin at bedside. He 

states that his headache and dizziness has resolved. He was instructed to 

follow up with his primary care physician and neurology. Patient states that he 

understands. 





 (Angel Weaver)





- RAD Interpretation


Narrative RAD Interpretations (Text): 





04/26/18 13:25


Head CT w/o - Normal CT of Head (Angel Weaver)


Radiology Orders: 








04/26/18 12:21


HEAD W/O CONTRAST [CT] Stat 














- EKG Interpretation


EKG Interpretation (Text): 





04/26/18 13:28


Normal sinus rhythm @ 65bpm, normal axis, no ST changes, normal ECG - no 

changes from previous ECG on 8/18/17 (Angel Weaver)





- Medication Orders


Current Medication Orders: 











Discontinued Medications





Acetaminophen (Tylenol 325mg Tab)  975 mg PO STAT STA


   Stop: 04/26/18 12:24


   Last Admin: 04/26/18 12:43  Dose: 975 mg





MAR Pain/Vitals


 Document     04/26/18 12:43  MS  (Rec: 04/26/18 12:44  MS  [id number])


     Pain Reassessment


      Is This A Pain ReAssessment?               No


     Sleep


      Is patient sleeping during reassessment?   No


     Presence of Pain


      Presence of Pain                           Yes


     Pain Scale Used


      Pain Scale Used                            Numeric


     Location


      Pain Location Body Site                    Head


      Description                                Intermittent


      Intensity                                  5





Diphenhydramine HCl (Benadryl)  50 mg IVP STAT STA


   Stop: 04/26/18 12:25


   Last Admin: 04/26/18 12:44  Dose: 50 mg





IVP Administration


 Document     04/26/18 12:44  MS  (Rec: 04/26/18 12:44  MS  [id number])


     Charges for Administration


      # of IVP Administrations                   1





Sodium Chloride (Sodium Chloride 0.9%)  1,000 mls @ 999 mls/hr IV .Q1H1M STA


   Stop: 04/26/18 13:24


   Last Admin: 04/26/18 12:44  Dose: 999 mls/hr





eMAR Start Stop


 Document     04/26/18 12:44  MS  (Rec: 04/26/18 12:45  MS  [id number])


     Intravenous Solution


      Start Date                                 04/26/18


      Start Time                                 12:45


      End Date                                   04/26/18


      End time                                   13:45


      Total Infusion Time                        60





Meclizine HCl (Antivert)  50 mg PO STAT STA


   Stop: 04/26/18 13:49


   Last Admin: 04/26/18 14:13 Dose:  50 mg


Metoclopramide HCl (Reglan)  10 mg IVP STAT STA


   Stop: 04/26/18 12:24


   Last Admin: 04/26/18 12:44  Dose: 10 mg





IVP Administration


 Document     04/26/18 12:44  MS  (Rec: 04/26/18 12:44  MS  [id number])


     Charges for Administration


      # of IVP Administrations                   1














Disposition/Present on Arrival





- Disposition


Have Diagnosis and Disposition been Completed?: Yes


Disposition Time: 14:16


Patient Plan: Discharge





<JannethjuleelisandroAmy BOOGIE - Last Filed: 04/26/18 14:19>





- Present on Arrival


Any Indicators Present on Arrival: No


History of DVT/PE: No


History of Uncontrolled Diabetes: No


Urinary Catheter: No


History of Decub. Ulcer: No


History Surgical Site Infection Following: None





- Disposition


Have Diagnosis and Disposition been Completed?: Yes


Patient Plan: Discharge





<Angel Weaver - Last Filed: 04/26/18 14:48>





- Disposition


Diagnosis: 


 Headache





Condition: IMPROVED


Discharge Instructions (ExitCare):  Tension Headache, Cluster Headache, 

Migraine Headache (DC)


Additional Instructions: 


Follow-up with PMD within 2 days.  Follow-up with neurologist for further 

evaluation of headaches.  Return to ED immediately if condition worsens.


Referrals: 


Morelia Pearson MD [Primary Care Provider] - Follow up with primary


Aron Slaughter MD [Staff Provider] - Follow up with primary


Forms:  "[employer] (English)

## 2018-04-27 NOTE — CARD
--------------- APPROVED REPORT --------------





EKG Measurement

Heart [id number]

VA [id number]

[id number]

[id number]

[id number]



<Conclusion>



Normal sinus rhythm

Normal ECG